# Patient Record
Sex: FEMALE | Race: WHITE
[De-identification: names, ages, dates, MRNs, and addresses within clinical notes are randomized per-mention and may not be internally consistent; named-entity substitution may affect disease eponyms.]

---

## 2018-06-21 ENCOUNTER — HOSPITAL ENCOUNTER (OUTPATIENT)
Dept: HOSPITAL 88 - US | Age: 33
End: 2018-06-21
Attending: INTERNAL MEDICINE
Payer: COMMERCIAL

## 2018-06-21 DIAGNOSIS — R10.9: Primary | ICD-10-CM

## 2018-06-21 PROCEDURE — 76705 ECHO EXAM OF ABDOMEN: CPT

## 2018-06-21 PROCEDURE — 78227 HEPATOBIL SYST IMAGE W/DRUG: CPT

## 2018-06-21 NOTE — DIAGNOSTIC IMAGING REPORT
PROCEDURE:US GALLBLADDER

COMPARISON:[facility], CT, CT ABDOMEN/PELVIS W, 

10/02/2017, 8:10.

INDICATION:Right upper quadrant pain

TECHNIQUE:Gray scale and color Doppler ultrasound gallbladder

 

FINDINGS:

Imaged portions of the inferior vena cava, abdominal aorta, pancreas 

and right kidney are normal.

 

Right liver span is 14.4 cm. Mildly increased echogenicity. Smooth 

contour. Portal vein diameter 0.9 cm; normal flow direction.

 

Normal gallbladder. Wall thickness 0.2 cm. Common bile duct diameter 

0.3 cm.

 

 

CONCLUSION:

1. Normal gallbladder.

2. Mildly echogenic liver in keeping with steatosis.

 

 

 

Dictated by:  Baltazar Simms M.D. on 6/21/2018 at 9:11     

Electronically approved by:  Baltazar Simms M.D. on 6/21/2018 at 

9:11

## 2018-06-21 NOTE — DIAGNOSTIC IMAGING REPORT
Hepatobiliary Scan with Gallbladder Ejection Fraction



Clinical information: 33 F with intermittent RUQ abdominal pain x 6 months



Report: Following intravenous administration of 7 millicuries of Tc-99m

mebrofenin, dynamic images of the abdomen in the anterior projection were

obtained through 40 minutes.  Sincalide (CCK analog) 2.0 micrograms was

administered intravenously over 30 minutes with additional imaging for

determination of gallbladder ejection fraction.



Perfusion to the liver is normal.  Extraction of tracer from the blood pool by

the liver parenchyma is normal.  Tracer is seen promptly within the biliary

tract.  The gallbladder begins to fill by 8 minutes post-injection of tracer

and fills adequately.  Tracer is seen in the small bowel by 28 minutes.  The

gallbladder ejection fraction with administration of sincalide is 57% (normal

greater than 40%).



Impression: 



1.  Filling of the gallbladder excludes the diagnosis of acute cystic duct

obstruction/acute cholecystitis.

2.  Normal gallbladder ejection fraction of 57% does not support the clinical

diagnosis of chronic cholecystitis/gallbladder dyskinesia.



Signed by: Dr. Tania Rodrigues M.D. on 6/21/2018 6:30 PM